# Patient Record
Sex: MALE | Race: WHITE | NOT HISPANIC OR LATINO | Employment: FULL TIME | URBAN - METROPOLITAN AREA
[De-identification: names, ages, dates, MRNs, and addresses within clinical notes are randomized per-mention and may not be internally consistent; named-entity substitution may affect disease eponyms.]

---

## 2017-06-07 DIAGNOSIS — D86.9 SARCOID: Primary | ICD-10-CM

## 2017-07-25 ENCOUNTER — OFFICE VISIT (OUTPATIENT)
Dept: PULMONOLOGY | Facility: CLINIC | Age: 57
End: 2017-07-25
Payer: COMMERCIAL

## 2017-07-25 ENCOUNTER — HOSPITAL ENCOUNTER (OUTPATIENT)
Dept: RADIOLOGY | Facility: HOSPITAL | Age: 57
Discharge: HOME | End: 2017-07-25
Attending: INTERNAL MEDICINE
Payer: COMMERCIAL

## 2017-07-25 ENCOUNTER — HOSPITAL ENCOUNTER (OUTPATIENT)
Dept: PULMONOLOGY | Facility: CLINIC | Age: 57
Discharge: HOME | End: 2017-07-25
Payer: COMMERCIAL

## 2017-07-25 VITALS
SYSTOLIC BLOOD PRESSURE: 128 MMHG | BODY MASS INDEX: 36.03 KG/M2 | WEIGHT: 266 LBS | HEART RATE: 92 BPM | DIASTOLIC BLOOD PRESSURE: 74 MMHG | HEIGHT: 72 IN | OXYGEN SATURATION: 98 %

## 2017-07-25 DIAGNOSIS — D86.9 SARCOID: ICD-10-CM

## 2017-07-25 DIAGNOSIS — J45.20 ASTHMA, MILD INTERMITTENT, WELL-CONTROLLED: ICD-10-CM

## 2017-07-25 DIAGNOSIS — D86.9 SARCOID: Primary | ICD-10-CM

## 2017-07-25 PROCEDURE — 99215 OFFICE O/P EST HI 40 MIN: CPT | Mod: S$GLB,,, | Performed by: INTERNAL MEDICINE

## 2017-07-25 PROCEDURE — 99999 PR PBB SHADOW E&M-EST. PATIENT-LVL III: CPT | Mod: PBBFAC,,, | Performed by: INTERNAL MEDICINE

## 2017-07-25 PROCEDURE — 94729 DIFFUSING CAPACITY: CPT | Mod: S$GLB,,, | Performed by: INTERNAL MEDICINE

## 2017-07-25 PROCEDURE — 71020 XR CHEST PA AND LATERAL: CPT | Mod: TC

## 2017-07-25 PROCEDURE — 71020 XR CHEST PA AND LATERAL: CPT | Mod: 26,,, | Performed by: RADIOLOGY

## 2017-07-25 PROCEDURE — 94727 GAS DIL/WSHOT DETER LNG VOL: CPT | Mod: S$GLB,,, | Performed by: INTERNAL MEDICINE

## 2017-07-25 RX ORDER — FAMOTIDINE 10 MG/1
10 TABLET ORAL 2 TIMES DAILY
COMMUNITY

## 2017-07-25 RX ORDER — BUDESONIDE AND FORMOTEROL FUMARATE DIHYDRATE 160; 4.5 UG/1; UG/1
2 AEROSOL RESPIRATORY (INHALATION) 2 TIMES DAILY
Qty: 1 INHALER | Refills: 12 | Status: SHIPPED | OUTPATIENT
Start: 2017-07-25 | End: 2018-03-22

## 2017-07-25 NOTE — LETTER
July 25, 2017    Bean Devlin  [address] [facility] [place] [address]             [facility]  [address]  [facility] [address]  Phone: [phone] Dear Mr. Devlin:     At this visit you have mild reduction in your flow rates compatible with moderate asthma. You were wheeze free on exam. I suggested starting you on Symbicort, more specific for asthma use the ventolin in midday and stay on the 10 mg of prednisone. Your chest x-ray shows chronic scarring in both upper lobes but unchanged from your study in 2015. The real objective is to control your asthma with the least amount of steroids as you lose weight.     If you have any questions or concerns, please don't hesitate to call.    Sincerely,        Joshua Benitez MD

## 2017-07-25 NOTE — LETTER
July 26, 2017    Bean Devlin  [address] [place] [address]             [facility]  [address]  [facility] [address]  Phone: [phone] Dear Mr. Devlin:    I am enclosing a summary of my evaluation and lab and x-ray reports from your visit of July 25th. I think that your primary problem is asthma against a background of Stage III sarcoid. I have no evidence that you have active sarcoid. I would like to hear from you in several weeks and we can begin a slow taper of the prednisone.    If you have any questions or concerns, please don't hesitate to call.    Sincerely,        Joshua Benitez MD

## 2017-07-25 NOTE — PROGRESS NOTES
Subjective:       Patient ID: Bean Devlin is a 57 y.o. male.    Chief Complaint: Shortness of Breath and Sarcoidosis    HPI  58 Yo male from [place] who I last saw in 2015 and first saw in 2008 for sarcoid and asthma. He is currently on 10 mg of prednisone and stileleto and sprivia and ventolin for his airway problems. He had severe paroxysms of cough. Now has an AM cough that clears shortly after he is up and about. He has 30+pounds on high dose steroids and that certainly contributes to his exertional dyspnea and aggravates his MINNIE      No flowsheet data found.]  Review of Systems   Constitutional: Negative.    HENT: Negative.    Eyes: Negative.    Respiratory: Positive for cough and dyspnea on extertion.         Stage III Sarcoid and Asthma         MINNIE         Cardiovascular: Negative.    Genitourinary: Negative.    Musculoskeletal: Negative.         Hx of cervical disc disease with radiculopathy   Skin: Negative.    Gastrointestinal: Negative.    Neurological: Negative.    Psychiatric/Behavioral: Negative.        Objective:      Physical Exam   Constitutional: He is oriented to person, place, and time. He appears well-developed and well-nourished.   Obese: BMI: 35   HENT:   Head: Normocephalic and atraumatic.   Right Ear: External ear normal.   Left Ear: External ear normal.   Eyes: Conjunctivae and EOM are normal. Pupils are equal, round, and reactive to light.   Neck: Normal range of motion. Neck supple.   Cardiovascular: Normal rate, regular rhythm and normal heart sounds.    Pulmonary/Chest: Effort normal and breath sounds normal.   Peak flow 350 lmin  No wheezes or rales    SaO2: 98%!   Abdominal: Soft. Bowel sounds are normal.   Musculoskeletal: Normal range of motion.   Neurological: He is alert and oriented to person, place, and time. He has normal reflexes.   Skin: Skin is warm and dry.   Psychiatric: He has a normal mood and affect. His behavior is normal. Judgment and thought content normal.            Assessment:       1. Sarcoid    2. Asthma, mild intermittent, well-controlled        Outpatient Encounter Prescriptions as of 7/25/2017   Medication Sig Dispense Refill    ADVAIR DISKUS 500-50 mcg/dose DsDv diskus inhaler USE 1 INHALATION ORALLY TWICE DAILY 180 Inhaler 0    budesonide-formoterol 160-4.5 mcg (SYMBICORT) 160-4.5 mcg/actuation HFAA Inhale 2 puffs into the lungs 2 (two) times daily. 1 Inhaler 12    cyclobenzaprine (FLEXERIL) 10 MG tablet       hydrocodone-acetaminophen 7.5-325mg (NORCO) 7.5-325 mg per tablet   0    levothyroxine (SYNTHROID) 25 MCG tablet       meloxicam (MOBIC) 15 MG tablet       omeprazole (PRILOSEC) 10 MG capsule Take 10 mg by mouth once daily.      oxymetazoline (AFRIN) 0.05 % nasal spray 1 spray by Nasal route once daily.      VENTOLIN HFA 90 mcg/actuation inhaler INHALE 2 PUFFS ORALLY FOUR TIMES DAILY AS NEEDED 1 Inhaler 6    zolpidem (AMBIEN) 10 mg Tab Take 1 tablet (10 mg total) by mouth nightly as needed. 60 tablet 0     No facility-administered encounter medications on file as of 7/25/2017.      No orders of the defined types were placed in this encounter.    Plan:       Chest x-ray is stable with chronic upper lobe changes compatible with Stage III Sarcoid, unchanged from our film of 2015. Reviewed CT done in [place] in June and it showed the same findings. NO radiographic changes  Over the last several years his s greatest problem is bronchospasm  against a backdrop of Stage III Sarcoid. Labs: Normal except for a glucose of 120. Will commit to a 2200 calorie diet and start symbicort BID and use ventolin midday. Will maintain on 10 mg of prednisone a few more weeks and then begin a slow taper. IMP: Stage III Sarcoid  And Moderate Persistent Ashtma Have given him a peak flow meter to monoitor his flow rates.. Today on his meter: 500 l/min and will try to that maintain level. Visit: 60 minutes reviewed outside CT scan and review our previous record and re  did his current med and gave consultation. Today  ACE level:62, 12 years ago it was 66!   I think that asthma is the source of his recurring breathing distress.

## 2017-08-14 ENCOUNTER — TELEPHONE (OUTPATIENT)
Dept: PULMONOLOGY | Facility: CLINIC | Age: 57
End: 2017-08-14

## 2017-08-14 NOTE — TELEPHONE ENCOUNTER
Mr Devlin called stating he is feeling good on the 10mg of Prednisone, Ventolin HFA 1 puff at noon, and Symbicort 160/ 4.5mcg 2 puffs BID. He is asking should he decrease any of these 3 medications? I spoke to Dr Benitez, he would like Mr Devlin to stay on exactly the same medicine and call agin next week. Patient understood. Daisy Patiño LPN>

## 2017-08-14 NOTE — TELEPHONE ENCOUNTER
----- Message from Alan Patricia sent at 8/14/2017  2:24 PM CDT -----  Contact: pt   Nurse Daisy: Pt would like to be called back regarding update. Pt is feeling much better and would like to see next step of being taken off medication.       Pt can be reached at [phone].

## 2017-08-24 ENCOUNTER — TELEPHONE (OUTPATIENT)
Dept: PULMONOLOGY | Facility: CLINIC | Age: 57
End: 2017-08-24

## 2017-10-18 ENCOUNTER — TELEPHONE (OUTPATIENT)
Dept: PULMONOLOGY | Facility: CLINIC | Age: 57
End: 2017-10-18

## 2017-10-18 NOTE — TELEPHONE ENCOUNTER
----- Message from Twyla Hickey sent at 10/17/2017 10:24 AM CDT -----  Contact: pt  Pt having health problem  -  Thinks hes  socrod  Is flared back up    What do you want the pt to do    Make appt?      Shabnam Ferreira       [phone]     Thanks

## 2017-10-18 NOTE — TELEPHONE ENCOUNTER
Mr Devlin has been SOB since 10-13-17, coughing a lot, fatigued and hoarse. He has taken 10mg of prednisone the last 3 days and feels alittle better. He will be in [place] on 10-26 and 10-27-17 and would like to see Dr Benitez. Dr Benitez said for Mr Devlin to start on 30mg of prednisone every AM for 5 to 7 days, then go down to 20mg Prednisone daily and be seen on 10-26 by Dr Benitez. Patient understands and is relieved. Daisy Patiño LPN.

## 2017-10-26 ENCOUNTER — HOSPITAL ENCOUNTER (OUTPATIENT)
Dept: PULMONOLOGY | Facility: CLINIC | Age: 57
Discharge: HOME | End: 2017-10-26
Payer: COMMERCIAL

## 2017-10-26 ENCOUNTER — OFFICE VISIT (OUTPATIENT)
Dept: PULMONOLOGY | Facility: CLINIC | Age: 57
End: 2017-10-26
Payer: COMMERCIAL

## 2017-10-26 VITALS
SYSTOLIC BLOOD PRESSURE: 124 MMHG | WEIGHT: 258 LBS | DIASTOLIC BLOOD PRESSURE: 74 MMHG | OXYGEN SATURATION: 96 % | BODY MASS INDEX: 34.95 KG/M2 | HEIGHT: 72 IN

## 2017-10-26 DIAGNOSIS — D86.9 SARCOID: ICD-10-CM

## 2017-10-26 DIAGNOSIS — J44.1 ASTHMA EXACERBATION IN COPD: Primary | ICD-10-CM

## 2017-10-26 DIAGNOSIS — J45.901 ASTHMA EXACERBATION IN COPD: Primary | ICD-10-CM

## 2017-10-26 LAB
PRE FEV1 FVC: 65
PRE FEV1: 2.52
PRE FVC: 3.85
PREDICTED FEV1 FVC: 80
PREDICTED FEV1: 4.02
PREDICTED FVC: 4.97

## 2017-10-26 PROCEDURE — 94010 BREATHING CAPACITY TEST: CPT | Mod: S$GLB,,, | Performed by: INTERNAL MEDICINE

## 2017-10-26 PROCEDURE — 99999 PR PBB SHADOW E&M-EST. PATIENT-LVL III: CPT | Mod: PBBFAC,,, | Performed by: INTERNAL MEDICINE

## 2017-10-26 PROCEDURE — 99214 OFFICE O/P EST MOD 30 MIN: CPT | Mod: S$GLB,,, | Performed by: INTERNAL MEDICINE

## 2017-10-26 RX ORDER — ZOLPIDEM TARTRATE 5 MG/1
1 TABLET ORAL NIGHTLY PRN
Refills: 1 | COMMUNITY
Start: 2017-09-09

## 2017-10-27 NOTE — PROGRESS NOTES
Subjective:       Patient ID: Bean Devlin is a 57 y.o. male.    Chief Complaint: No chief complaint on file.    HPI  56 yo male with inactive sarcoid had an exacerbation of asthma last week,  He started himself on 60 mg of prednisone and feels great today and is wheeze free. His PFT's FVC:77% and Fev-1:2.52 litess 66% of FVC  I have suggested that he drop down to 30 mg of prednisone and decreased the dosage by 10 mg every 3 days and maintain himself on Symicort Bid/      No flowsheet data found.]  Review of Systems   Constitutional: Negative.    HENT: Negative.    Eyes: Negative.    Respiratory: Negative.         Hx of inactive sarcoid and recent asthma exacerbation   Cardiovascular: Negative.    Genitourinary: Negative.    Musculoskeletal: Negative.    Skin: Negative.    Gastrointestinal: Negative.    Neurological: Negative.    Psychiatric/Behavioral: Negative.        Objective:      Physical Exam   Constitutional: He is oriented to person, place, and time. He appears well-developed and well-nourished.   HENT:   Head: Normocephalic and atraumatic.   Right Ear: External ear normal.   Left Ear: External ear normal.   Eyes: Conjunctivae and EOM are normal. Pupils are equal, round, and reactive to light.   Neck: Normal range of motion. Neck supple.   Cardiovascular: Normal rate, regular rhythm and normal heart sounds.    Pulmonary/Chest: Effort normal and breath sounds normal.   Wheeze free  Peak flow 400 l/min   Abdominal: Soft. Bowel sounds are normal.   Musculoskeletal: Normal range of motion.   Neurological: He is alert and oriented to person, place, and time. He has normal reflexes.   Skin: Skin is warm and dry.   Psychiatric: He has a normal mood and affect. His behavior is normal. Judgment and thought content normal.           Assessment:       No diagnosis found.    Outpatient Encounter Prescriptions as of 10/26/2017   Medication Sig Dispense Refill    budesonide-formoterol 160-4.5 mcg (SYMBICORT)  160-4.5 mcg/actuation HFAA Inhale 2 puffs into the lungs 2 (two) times daily. 1 Inhaler 12    cyclobenzaprine (FLEXERIL) 10 MG tablet       famotidine (PEPCID) 10 MG tablet Take 10 mg by mouth 2 (two) times daily.      oxymetazoline (AFRIN) 0.05 % nasal spray 1 spray by Nasal route once daily.      VENTOLIN HFA 90 mcg/actuation inhaler INHALE 2 PUFFS ORALLY FOUR TIMES DAILY AS NEEDED 1 Inhaler 6    zolpidem (AMBIEN) 5 MG Tab Take 1 tablet by mouth nightly as needed.  1    [DISCONTINUED] zolpidem (AMBIEN) 10 mg Tab Take 1 tablet (10 mg total) by mouth nightly as needed. 60 tablet 0     No facility-administered encounter medications on file as of 10/26/2017.      No orders of the defined types were placed in this encounter.    Plan:

## 2017-11-28 ENCOUNTER — PATIENT MESSAGE (OUTPATIENT)
Dept: PULMONOLOGY | Facility: CLINIC | Age: 57
End: 2017-11-28

## 2018-03-22 ENCOUNTER — HOSPITAL ENCOUNTER (EMERGENCY)
Facility: HOSPITAL | Age: 58
Discharge: HOME | End: 2018-03-22
Attending: EMERGENCY MEDICINE
Payer: COMMERCIAL

## 2018-03-22 VITALS
RESPIRATION RATE: 21 BRPM | DIASTOLIC BLOOD PRESSURE: 95 MMHG | HEART RATE: 93 BPM | SYSTOLIC BLOOD PRESSURE: 165 MMHG | WEIGHT: 266.13 LBS | BODY MASS INDEX: 35.27 KG/M2 | TEMPERATURE: 98 F | HEIGHT: 73 IN | OXYGEN SATURATION: 96 %

## 2018-03-22 DIAGNOSIS — J06.9 UPPER RESPIRATORY TRACT INFECTION, UNSPECIFIED TYPE: Primary | ICD-10-CM

## 2018-03-22 DIAGNOSIS — R05.9 COUGH: ICD-10-CM

## 2018-03-22 DIAGNOSIS — B37.81 THRUSH OF MOUTH AND ESOPHAGUS: ICD-10-CM

## 2018-03-22 DIAGNOSIS — D86.2 STAGE II SARCOIDOSIS: ICD-10-CM

## 2018-03-22 DIAGNOSIS — R03.0 ELEVATED BLOOD PRESSURE READING: ICD-10-CM

## 2018-03-22 DIAGNOSIS — B37.0 THRUSH OF MOUTH AND ESOPHAGUS: ICD-10-CM

## 2018-03-22 LAB
ALBUMIN SERPL BCP-MCNC: 3.6 G/DL
ALP SERPL-CCNC: 77 U/L
ALT SERPL W/O P-5'-P-CCNC: 63 U/L
ANION GAP SERPL CALC-SCNC: 10 MMOL/L
AST SERPL-CCNC: 28 U/L
BASOPHILS # BLD AUTO: 0.01 K/UL
BASOPHILS NFR BLD: 0.1 %
BILIRUB SERPL-MCNC: 0.5 MG/DL
BNP SERPL-MCNC: 22 PG/ML
BUN SERPL-MCNC: 14 MG/DL
CALCIUM SERPL-MCNC: 9 MG/DL
CHLORIDE SERPL-SCNC: 105 MMOL/L
CO2 SERPL-SCNC: 24 MMOL/L
CREAT SERPL-MCNC: 0.9 MG/DL
D DIMER PPP IA.FEU-MCNC: 0.28 MG/L FEU
DIFFERENTIAL METHOD: ABNORMAL
EOSINOPHIL # BLD AUTO: 0.1 K/UL
EOSINOPHIL NFR BLD: 0.7 %
ERYTHROCYTE [DISTWIDTH] IN BLOOD BY AUTOMATED COUNT: 14.2 %
EST. GFR  (AFRICAN AMERICAN): >60 ML/MIN/1.73 M^2
EST. GFR  (NON AFRICAN AMERICAN): >60 ML/MIN/1.73 M^2
GLUCOSE SERPL-MCNC: 116 MG/DL
HCT VFR BLD AUTO: 43.7 %
HGB BLD-MCNC: 14.6 G/DL
LYMPHOCYTES # BLD AUTO: 0.9 K/UL
LYMPHOCYTES NFR BLD: 10.6 %
MCH RBC QN AUTO: 30.7 PG
MCHC RBC AUTO-ENTMCNC: 33.4 G/DL
MCV RBC AUTO: 92 FL
MONOCYTES # BLD AUTO: 0.5 K/UL
MONOCYTES NFR BLD: 5.9 %
NEUTROPHILS # BLD AUTO: 7 K/UL
NEUTROPHILS NFR BLD: 82.7 %
PLATELET # BLD AUTO: 175 K/UL
PMV BLD AUTO: 10.1 FL
POTASSIUM SERPL-SCNC: 3.9 MMOL/L
PROT SERPL-MCNC: 7 G/DL
RBC # BLD AUTO: 4.76 M/UL
SODIUM SERPL-SCNC: 139 MMOL/L
WBC # BLD AUTO: 8.46 K/UL

## 2018-03-22 PROCEDURE — 96374 THER/PROPH/DIAG INJ IV PUSH: CPT

## 2018-03-22 PROCEDURE — 99285 EMERGENCY DEPT VISIT HI MDM: CPT | Mod: 25

## 2018-03-22 PROCEDURE — 93010 ELECTROCARDIOGRAM REPORT: CPT | Mod: ,,, | Performed by: INTERNAL MEDICINE

## 2018-03-22 PROCEDURE — 63600175 PHARM REV CODE 636 W HCPCS: Performed by: EMERGENCY MEDICINE

## 2018-03-22 PROCEDURE — 25000003 PHARM REV CODE 250: Performed by: EMERGENCY MEDICINE

## 2018-03-22 PROCEDURE — 83880 ASSAY OF NATRIURETIC PEPTIDE: CPT

## 2018-03-22 PROCEDURE — 85025 COMPLETE CBC W/AUTO DIFF WBC: CPT

## 2018-03-22 PROCEDURE — 93005 ELECTROCARDIOGRAM TRACING: CPT

## 2018-03-22 PROCEDURE — 94640 AIRWAY INHALATION TREATMENT: CPT

## 2018-03-22 PROCEDURE — 25000242 PHARM REV CODE 250 ALT 637 W/ HCPCS: Performed by: EMERGENCY MEDICINE

## 2018-03-22 PROCEDURE — 85379 FIBRIN DEGRADATION QUANT: CPT

## 2018-03-22 PROCEDURE — 80053 COMPREHEN METABOLIC PANEL: CPT

## 2018-03-22 RX ORDER — IPRATROPIUM BROMIDE AND ALBUTEROL SULFATE 2.5; .5 MG/3ML; MG/3ML
3 SOLUTION RESPIRATORY (INHALATION)
Status: COMPLETED | OUTPATIENT
Start: 2018-03-22 | End: 2018-03-22

## 2018-03-22 RX ORDER — IPRATROPIUM BROMIDE AND ALBUTEROL SULFATE 2.5; .5 MG/3ML; MG/3ML
3 SOLUTION RESPIRATORY (INHALATION) ONCE
Status: COMPLETED | OUTPATIENT
Start: 2018-03-22 | End: 2018-03-22

## 2018-03-22 RX ORDER — NYSTATIN 100000 [USP'U]/ML
500000 SUSPENSION ORAL 4 TIMES DAILY
Qty: 200 ML | Refills: 0 | Status: SHIPPED | OUTPATIENT
Start: 2018-03-22 | End: 2018-04-01

## 2018-03-22 RX ORDER — BENZONATATE 100 MG/1
200 CAPSULE ORAL 3 TIMES DAILY PRN
Qty: 30 CAPSULE | Refills: 0 | Status: SHIPPED | OUTPATIENT
Start: 2018-03-22 | End: 2018-04-01

## 2018-03-22 RX ORDER — BENZONATATE 100 MG/1
200 CAPSULE ORAL
Status: COMPLETED | OUTPATIENT
Start: 2018-03-22 | End: 2018-03-22

## 2018-03-22 RX ORDER — METHYLPREDNISOLONE SOD SUCC 125 MG
125 VIAL (EA) INJECTION
Status: COMPLETED | OUTPATIENT
Start: 2018-03-22 | End: 2018-03-22

## 2018-03-22 RX ORDER — MONTELUKAST SODIUM 10 MG/1
10 TABLET ORAL NIGHTLY
COMMUNITY

## 2018-03-22 RX ORDER — PREDNISONE 20 MG/1
20 TABLET ORAL DAILY
COMMUNITY
End: 2018-03-22

## 2018-03-22 RX ORDER — LEVOFLOXACIN 500 MG/1
500 TABLET, FILM COATED ORAL
COMMUNITY

## 2018-03-22 RX ORDER — PREDNISONE 20 MG/1
20 TABLET ORAL 2 TIMES DAILY
Qty: 6 TABLET | Refills: 0 | Status: SHIPPED | OUTPATIENT
Start: 2018-03-22

## 2018-03-22 RX ADMIN — IPRATROPIUM BROMIDE AND ALBUTEROL SULFATE 3 ML: .5; 3 SOLUTION RESPIRATORY (INHALATION) at 07:03

## 2018-03-22 RX ADMIN — Medication 2 SPRAY: at 09:03

## 2018-03-22 RX ADMIN — METHYLPREDNISOLONE SODIUM SUCCINATE 125 MG: 125 INJECTION, POWDER, FOR SOLUTION INTRAMUSCULAR; INTRAVENOUS at 07:03

## 2018-03-22 RX ADMIN — BENZONATATE 200 MG: 100 CAPSULE ORAL at 09:03

## 2018-03-22 RX ADMIN — IPRATROPIUM BROMIDE AND ALBUTEROL SULFATE 3 ML: .5; 3 SOLUTION RESPIRATORY (INHALATION) at 08:03

## 2018-03-22 NOTE — PROGRESS NOTES
Pt educated at length about nebulizer home use as well as rescue and maintenance medication and the use of each. Pt encouraged see pulmonary doctor when possible to go over home regimen.

## 2018-03-22 NOTE — ED NOTES
Patient identifiers verified and correct for Bean Devlin.    LOC: The patient is awake, alert and aware of environment with an appropriate affect, the patient is oriented x 3 and speaking appropriately.  APPEARANCE: Patient resting comfortably and in no acute distress, patient is clean and well groomed, patient's clothing is properly fastened.  SKIN: The skin is warm and dry, color consistent with ethnicity, patient has normal skin turgor and moist mucus membranes, skin intact, no breakdown or bruising noted.  MUSCULOSKELETAL: Patient moving all extremities spontaneously.  RESPIRATORY: Airway is open and patent, respirations are spontaneous. Bilateral breath sounds coarse.  CARDIAC: Patient has a normal rate, no periphreal edema noted, capillary refill < 3 seconds.  ABDOMEN: Soft and non tender to palpation.

## 2018-03-22 NOTE — ED PROVIDER NOTES
SCRIBE #1 NOTE: I, Barney Martinez, am scribing for, and in the presence of, Gabrielle Solo DO. I have scribed the entire note.      History      Chief Complaint   Patient presents with    URI     Pt reports URI x 8 days. Pt reports seing 3 different doctors receiving steroids, inhalers, etc. Hx of Sarcadosis       Review of patient's allergies indicates:   Allergen Reactions    Gabapentin Other (See Comments)     Severe mouth rash/extremely dry/white bumps        HPI   HPI    3/22/2018, 6:29 AM 2  History obtained from the patient and wife      History of Present Illness: Bean Devlin is a 58 y.o. male patient with a PMHx of sarcoidosis, who presents to the Emergency Department for cough which onset gradually 8 days ago. Sxs are constant and moderate in severity. Pt saw PCP 4 days ago for similar sxs, was given cortisone IM, currently on Prednisone 20 mg daily and Rx albuterol, Singulair, and Levofloxacin 500 mg. Pt is also taking phenergan with DM for cough. Pt reports sxs are not improving. There are no mitigating or exacerbating factors noted. Associated sxs include congestion, rhinorrhea, SOB with exertion or when laying flat, neck swelling. Wife reports pt hoarseness of voice earlier this week. Pt denies any fever, chills, N/V/D, leg swelling, calf pain, numbness, HA, LOC, chest pain, and all other sxs at this time. Pt notes that he does travel long distances for work and currently lives in [place]. No further complaints or concerns at this time.       Arrival mode: Personal vehicle     PCP: Suzanna Castellanos (Inactive)       Past Medical History:  Past Medical History:   Diagnosis Date    GERD (gastroesophageal reflux disease)     HTN (hypertension)     Prostate enlargement     Sarcoid     Sleep apnea        Past Surgical History:  Past Surgical History:   Procedure Laterality Date    GALLBLADDER SURGERY      2009/2010 for gall stone    HERNIA REPAIR      [employer]- age early 41, Inguinal  hernia late 30's         Family History:  Family History   Problem Relation Age of Onset    Aneurysm Father 81     thorasic aorta,Legs.Had CABG thrice    Heart attack Father 50    Heart disease       paternal grand father    Cancer       great,great grand  mother-colon cancer    Lung cancer       maternal grand mother    Diabetes Mellitus       Paternal grand mother    Diabetes Mellitus Mother     Heart disease       cousin       Social History:  Social History     Social History Main Topics    Smoking status: Never Smoker    Smokeless tobacco: Never Used    Alcohol use Yes      Comment: occasional    Drug use: No    Sexual activity: unknown       ROS   Review of Systems   Constitutional: Negative for chills and fever.   HENT: Positive for congestion and rhinorrhea. Negative for sore throat.    Respiratory: Positive for cough and shortness of breath.    Cardiovascular: Negative for chest pain.   Gastrointestinal: Negative for nausea.   Genitourinary: Negative for dysuria.   Musculoskeletal: Negative for back pain.        (+) neck swelling   Skin: Negative for rash.   Neurological: Negative for weakness.   Hematological: Does not bruise/bleed easily.     Physical Exam      Initial Vitals [03/22/18 0611]   BP Pulse Resp Temp SpO2   (!) 169/95 88 20 98.4 °F (36.9 °C) 95 %      MAP       119.67          Physical Exam  Nursing Notes and Vital Signs Reviewed.  Constitutional: Patient is in no acute distress. Well-developed and well-nourished. Active cough.   Head: Atraumatic. Normocephalic.  Eyes: PERRL. EOM intact. Conjunctivae are not pale. No scleral icterus.  Ears: Right TM normal. Left TM normal. No erythema. No bulging. No effusion or air-fluid levels. No perforation.   Nose: Patent nares. Turbinates are normal. No drainage.  Boggy nasal mucosa.  Throat: Moist mucous membranes. Posterior oropharynx is symmetric with erythema. Tonsillar exudate is not present. No trismus. Normal handling of secretions.  "No stridor. No drooling.  Neck: Supple. Full ROM. No lymphadenopathy.  Cardiovascular: Regular rate. Regular rhythm. No murmurs, rubs, or gallops. Distal pulses are 2+ and symmetric.  Pulmonary/Chest: No respiratory distress. Clear to auscultation bilaterally. No wheezing or rales.  Abdominal: Soft and non-distended.  There is no tenderness.  No rebound, guarding, or rigidity. Good bowel sounds.  Genitourinary: No CVA tenderness  Musculoskeletal: Moves all extremities. No obvious deformities. No edema. No calf tenderness.  Skin: Warm and dry.  Neurological:  Alert, awake, and appropriate.  Normal speech.  No acute focal neurological deficits are appreciated.  Psychiatric: Normal affect. Good eye contact. Appropriate in content.    ED Course    Procedures  ED Vital Signs:  Vitals:    03/22/18 0611 03/22/18 0741 03/22/18 0800 03/22/18 0815   BP: (!) 169/95  (!) 146/82    Pulse: 88 80 82 84   Resp: 20 (!) 22 (!) 21 18   Temp: 98.4 °F (36.9 °C)      TempSrc: Oral      SpO2: 95% 99% 97% 100%   Weight: 120.7 kg (266 lb 1.5 oz)      Height: 6' 1" (1.854 m)       03/22/18 0821 03/22/18 0900   BP:  (!) 165/95   Pulse: 83 93   Resp: 14 (!) 21   Temp:     TempSrc:     SpO2: 100% 96%   Weight:     Height:         Abnormal Lab Results:  Labs Reviewed   CBC W/ AUTO DIFFERENTIAL - Abnormal; Notable for the following:        Result Value    Lymph # 0.9 (*)     Gran% 82.7 (*)     Lymph% 10.6 (*)     All other components within normal limits   COMPREHENSIVE METABOLIC PANEL - Abnormal; Notable for the following:     Glucose 116 (*)     ALT 63 (*)     All other components within normal limits   B-TYPE NATRIURETIC PEPTIDE   D DIMER, QUANTITATIVE        All Lab Results:  Results for orders placed or performed during the hospital encounter of 03/22/18   CBC auto differential   Result Value Ref Range    WBC 8.46 3.90 - 12.70 K/uL    RBC 4.76 4.60 - 6.20 M/uL    Hemoglobin 14.6 14.0 - 18.0 g/dL    Hematocrit 43.7 40.0 - 54.0 %    MCV 92 82 " - 98 fL    MCH 30.7 27.0 - 31.0 pg    MCHC 33.4 32.0 - 36.0 g/dL    RDW 14.2 11.5 - 14.5 %    Platelets 175 150 - 350 K/uL    MPV 10.1 9.2 - 12.9 fL    Gran # (ANC) 7.0 1.8 - 7.7 K/uL    Lymph # 0.9 (L) 1.0 - 4.8 K/uL    Mono # 0.5 0.3 - 1.0 K/uL    Eos # 0.1 0.0 - 0.5 K/uL    Baso # 0.01 0.00 - 0.20 K/uL    Gran% 82.7 (H) 38.0 - 73.0 %    Lymph% 10.6 (L) 18.0 - 48.0 %    Mono% 5.9 4.0 - 15.0 %    Eosinophil% 0.7 0.0 - 8.0 %    Basophil% 0.1 0.0 - 1.9 %    Differential Method Automated    Comprehensive metabolic panel   Result Value Ref Range    Sodium 139 136 - 145 mmol/L    Potassium 3.9 3.5 - 5.1 mmol/L    Chloride 105 95 - 110 mmol/L    CO2 24 23 - 29 mmol/L    Glucose 116 (H) 70 - 110 mg/dL    BUN, Bld 14 6 - 20 mg/dL    Creatinine 0.9 0.5 - 1.4 mg/dL    Calcium 9.0 8.7 - 10.5 mg/dL    Total Protein 7.0 6.0 - 8.4 g/dL    Albumin 3.6 3.5 - 5.2 g/dL    Total Bilirubin 0.5 0.1 - 1.0 mg/dL    Alkaline Phosphatase 77 55 - 135 U/L    AST 28 10 - 40 U/L    ALT 63 (H) 10 - 44 U/L    Anion Gap 10 8 - 16 mmol/L    eGFR if African American >60 >60 mL/min/1.73 m^2    eGFR if non African American >60 >60 mL/min/1.73 m^2   Brain natriuretic peptide   Result Value Ref Range    BNP 22 0 - 99 pg/mL   D dimer, quantitative   Result Value Ref Range    D-Dimer 0.28 <0.50 mg/L FEU         Imaging Results:  Imaging Results          X-Ray Chest PA And Lateral (Final result)  Result time 03/22/18 08:18:45    Final result by Pj Cintron MD (Timothy) (03/22/18 08:18:45)                 Impression:         Normal sized heart. Persistent masslike opacity left hilum and medial aspect left upper lobe.  This appears stable.  It appears the adenopathy in the right hilar region as well as focal nodular thickening in the mid right upper lobe.  Findings correspond to parenchymal changes and adenopathy secondary to sarcoid.  Findings appear stable compared to 07/25/2017.      Electronically signed by: PJ CINTRON MD  Date:      03/22/18  Time:    08:18              Narrative:    CXR, 2 views    Clinical History:    Sarcoid                                      The EKG was ordered, reviewed, and independently interpreted by the ED provider.  Interpretation time: 7:40  Rate: 82 BPM  Rhythm: normal sinus rhythm  Interpretation: No acute ST changes. No STEMI.      The Emergency Provider reviewed the vital signs and test results, which are outlined above.    ED Discussion     8:10 AM: Re-evaluated pt. Pt is resting comfortably and is in no acute distress. D/w pt all pertinent results. Answered all pt's questions. Pt expresses understanding at this time.    8:23 AM: Dr. Doshi discussed the pt's case with Dr. Benitez (Pulmonology) who recommends 20 mg prednisone BID for 3 days and f/u with Dr. Benitez in 2 days.    9:12 AM: Reassessed pt. Discussed with pt all pertinent ED information and results. Discussed plan of treatment with pt. Pt instructed to f/u with Dr. Benitez in 2 days. Gave pt all f/u and return to the ED instructions. All questions and concerns were addressed at this time. Pt understands and agrees to plan as discussed. Pt is stable for discharge.       ED Medication(s):  Medications   methylPREDNISolone sodium succinate injection 125 mg (125 mg Intravenous Given 3/22/18 0723)   albuterol-ipratropium 2.5mg-0.5mg/3mL nebulizer solution 3 mL (3 mLs Nebulization Given 3/22/18 0741)   albuterol-ipratropium 2.5mg-0.5mg/3mL nebulizer solution 3 mL (3 mLs Nebulization Given 3/22/18 0821)   phenylephrine HCL 0.5% nasal spray 2 spray (2 sprays Each Nare Given 3/22/18 0921)   benzonatate capsule 200 mg (200 mg Oral Given 3/22/18 0921)       Discharge Medication List as of 3/22/2018  9:35 AM      START taking these medications    Details   benzonatate (TESSALON) 100 MG capsule Take 2 capsules (200 mg total) by mouth 3 (three) times daily as needed for Cough., Starting Thu 3/22/2018, Until Sun 4/1/2018, Print      nystatin (MYCOSTATIN) 100,000  unit/mL suspension Take 5 mLs (500,000 Units total) by mouth 4 (four) times daily. Swish in mouth, then swallow., Starting Thu 3/22/2018, Until Sun 4/1/2018, Print             Follow-up Information     Catalino Benitez MD In 1 day.    Specialty:  Pulmonary Disease  Why:  Return to the ED for:   chest pain, chest pressure, shortness of breath, fever, or other concerns.  Contact information:  Nohemi OLIVA  [facility] [id number] [phone]                     Medical Decision Making    Medical Decision Making:   Clinical Tests:   Lab Tests: Reviewed and Ordered  Radiological Study: Reviewed and Ordered  Medical Tests: Reviewed and Ordered           Scribe Attestation:   Scribe #1: I performed the above scribed service and the documentation accurately describes the services I performed. I attest to the accuracy of the note.    Attending:   Physician Attestation Statement for Scribe #1: I, Gabrielle Solo DO, personally performed the services described in this documentation, as scribed by Barney Martinez, in my presence, and it is both accurate and complete.          Clinical Impression       ICD-10-CM ICD-9-CM   1. Upper respiratory tract infection, unspecified type J06.9 465.9   2. Cough R05 786.2   3. Stage II sarcoidosis D86.9 135   4. Elevated blood pressure reading R03.0 796.2   5. Thrush of mouth and esophagus B37.81 112.84    B37.0 112.0       Disposition:   Disposition: Discharged  Condition: Stable         Gabrielle Solo DO  03/22/18 5195

## 2018-03-23 ENCOUNTER — TELEPHONE (OUTPATIENT)
Dept: PULMONOLOGY | Facility: CLINIC | Age: 58
End: 2018-03-23

## 2018-03-23 NOTE — TELEPHONE ENCOUNTER
----- Message from Teresa Curry sent at 3/23/2018  2:22 PM CDT -----  Contact: self [phone]  Patient states Dr Benitez told him to call and follow up with over the phone after he seen him in the Hospital.
